# Patient Record
Sex: MALE | Race: WHITE | NOT HISPANIC OR LATINO
[De-identification: names, ages, dates, MRNs, and addresses within clinical notes are randomized per-mention and may not be internally consistent; named-entity substitution may affect disease eponyms.]

---

## 2022-01-25 ENCOUNTER — TRANSCRIPTION ENCOUNTER (OUTPATIENT)
Age: 62
End: 2022-01-25

## 2022-04-07 ENCOUNTER — APPOINTMENT (OUTPATIENT)
Dept: ORTHOPEDIC SURGERY | Facility: CLINIC | Age: 62
End: 2022-04-07
Payer: OTHER MISCELLANEOUS

## 2022-04-07 VITALS — BODY MASS INDEX: 28.44 KG/M2 | WEIGHT: 210 LBS | HEIGHT: 72 IN

## 2022-04-07 DIAGNOSIS — Z86.39 PERSONAL HISTORY OF OTHER ENDOCRINE, NUTRITIONAL AND METABOLIC DISEASE: ICD-10-CM

## 2022-04-07 DIAGNOSIS — Z86.79 PERSONAL HISTORY OF OTHER DISEASES OF THE CIRCULATORY SYSTEM: ICD-10-CM

## 2022-04-07 PROBLEM — Z00.00 ENCOUNTER FOR PREVENTIVE HEALTH EXAMINATION: Status: ACTIVE | Noted: 2022-04-07

## 2022-04-07 PROCEDURE — 99024 POSTOP FOLLOW-UP VISIT: CPT

## 2022-04-07 NOTE — PHYSICAL EXAM
[Left] : left shoulder [] : AC joint tenderness [4___] : external rotation 4[unfilled]/5 [5___] : internal rotation 5[unfilled]/5 [TWNoteComboBox7] : active forward flexion 130 degrees [de-identified] : active abduction 120 degrees [TWNoteComboBox6] : internal rotation L1 [de-identified] : external rotation 20 degrees

## 2022-04-07 NOTE — HISTORY OF PRESENT ILLNESS
[Not working due to injury] : Work status: not working due to injury [de-identified] : Pt reports some improvement since last visit \par Reports going to PT 3x/week with relief. States he was advised not to RTW yet per PT \par Denies taking pain meds.  [de-identified] : Vicki at [facility]

## 2022-04-07 NOTE — ASSESSMENT
[FreeTextEntry1] : Patient responded well to physical therapy with symptomatic improvement. Recommend continuing physical therapy to regain range of motion and strength. \par Worker's compensation 100% temporarily disabled \par Recommend: - rest - ice - compression - elevation\par FOllow up in 1 month

## 2022-05-12 ENCOUNTER — APPOINTMENT (OUTPATIENT)
Dept: ORTHOPEDIC SURGERY | Facility: CLINIC | Age: 62
End: 2022-05-12
Payer: OTHER MISCELLANEOUS

## 2022-05-12 VITALS — HEIGHT: 72 IN | WEIGHT: 210 LBS | BODY MASS INDEX: 28.44 KG/M2

## 2022-05-12 DIAGNOSIS — Z78.9 OTHER SPECIFIED HEALTH STATUS: ICD-10-CM

## 2022-05-12 PROCEDURE — 99215 OFFICE O/P EST HI 40 MIN: CPT

## 2022-05-12 PROCEDURE — 99072 ADDL SUPL MATRL&STAF TM PHE: CPT

## 2022-05-12 RX ORDER — ONDANSETRON 4 MG/1
4 TABLET ORAL
Refills: 0 | Status: ACTIVE | COMMUNITY

## 2022-05-12 RX ORDER — ATORVASTATIN CALCIUM 40 MG/1
40 TABLET, FILM COATED ORAL
Refills: 0 | Status: ACTIVE | COMMUNITY

## 2022-05-12 RX ORDER — AMLODIPINE BESYLATE 10 MG/1
10 TABLET ORAL
Refills: 0 | Status: ACTIVE | COMMUNITY

## 2022-05-12 RX ORDER — OXYCODONE AND ACETAMINOPHEN 10; 325 MG/1; MG/1
10-325 TABLET ORAL
Refills: 0 | Status: ACTIVE | COMMUNITY

## 2022-05-12 NOTE — REASON FOR VISIT
[FreeTextEntry2] : left shoulder pain after slipping off a ladder while putting up a banner at work 6/21/21\par S/P Left shoulder arthroscopy 1/26/2022

## 2022-05-12 NOTE — ASSESSMENT
[FreeTextEntry1] : Patient responded well to physical therapy with symptomatic improvement. Recommend continuing physical therapy to regain range of motion and strength. \par Worker's compensation 100% temporarily disabled \par Recommend: - rest - ice - compression - elevation\par FOllow up in 6 weeks

## 2022-05-12 NOTE — HISTORY OF PRESENT ILLNESS
[Not working due to injury] : Work status: not working due to injury [de-identified] : Vicki at [facility] [employer]  [de-identified] : Pt reports some improvement since last visit \par Reports going to PT 3x/week with relief. States he was advised not to RTW yet per PT \par Denies taking pain meds.

## 2022-05-12 NOTE — PHYSICAL EXAM
[Left] : left shoulder [4___] : external rotation 4[unfilled]/5 [5___] : internal rotation 5[unfilled]/5 [] : no deformity [TWNoteComboBox7] : active forward flexion 130 degrees [de-identified] : active abduction 120 degrees [TWNoteComboBox6] : internal rotation L1 [de-identified] : external rotation 20 degrees

## 2022-06-23 ENCOUNTER — APPOINTMENT (OUTPATIENT)
Dept: ORTHOPEDIC SURGERY | Facility: CLINIC | Age: 62
End: 2022-06-23
Payer: OTHER MISCELLANEOUS

## 2022-06-23 VITALS — BODY MASS INDEX: 28.44 KG/M2 | HEIGHT: 72 IN | WEIGHT: 210 LBS

## 2022-06-23 PROCEDURE — 99072 ADDL SUPL MATRL&STAF TM PHE: CPT

## 2022-06-23 PROCEDURE — 99215 OFFICE O/P EST HI 40 MIN: CPT

## 2022-06-23 NOTE — PHYSICAL EXAM
[Left] : left shoulder [4___] : external rotation 4[unfilled]/5 [5___] : internal rotation 5[unfilled]/5 [] : no tenderness to palpation [FreeTextEntry9] : IR -  T10 [TWNoteComboBox7] : active forward flexion 165 degrees [de-identified] : active abduction 110 degrees [TWNoteComboBox6] : internal rotation 0 degrees [de-identified] : external rotation 15 degrees

## 2022-06-23 NOTE — ASSESSMENT
[FreeTextEntry1] : Patient responded well to physical therapy with symptomatic improvement. Recommend continuing physical therapy to regain range of motion and strength. \par \par Worker's compensation 100% temporarily disabled \par \par Recommend: - rest - ice - compression - elevation\par \par Follow up in 6 weeks

## 2022-06-23 NOTE — HISTORY OF PRESENT ILLNESS
[Not working due to injury] : Work status: not working due to injury [de-identified] : Vicki at [facility]  [de-identified] : Pt reports some improvement since last visit \par Reports going to PT 3x/week with relief. States he was advised not to RTW yet per PT \par Denies taking pain meds.

## 2022-08-04 ENCOUNTER — APPOINTMENT (OUTPATIENT)
Dept: ORTHOPEDIC SURGERY | Facility: CLINIC | Age: 62
End: 2022-08-04

## 2022-08-04 VITALS — WEIGHT: 210 LBS | BODY MASS INDEX: 28.44 KG/M2 | HEIGHT: 72 IN

## 2022-08-04 PROCEDURE — 99072 ADDL SUPL MATRL&STAF TM PHE: CPT

## 2022-08-04 PROCEDURE — 99215 OFFICE O/P EST HI 40 MIN: CPT

## 2022-08-04 NOTE — HISTORY OF PRESENT ILLNESS
[Not working due to injury] : Work status: not working due to injury [de-identified] : Vicki at [facility]  [de-identified] : Pt reports some improvement since last visit \par Reports going to PT 3x/week with relief. States he was advised not to RTW yet per PT \par Denies taking pain meds.

## 2022-08-04 NOTE — PHYSICAL EXAM
[Left] : left shoulder [4___] : external rotation 4[unfilled]/5 [5___] : internal rotation 5[unfilled]/5 [] : no tenderness to palpation [FreeTextEntry9] : IR -  T10 [TWNoteComboBox7] : active forward flexion 165 degrees [de-identified] : active abduction 110 degrees [de-identified] : external rotation 15 degrees

## 2022-09-21 ENCOUNTER — APPOINTMENT (OUTPATIENT)
Dept: ORTHOPEDIC SURGERY | Facility: CLINIC | Age: 62
End: 2022-09-21

## 2022-09-21 VITALS — WEIGHT: 210 LBS | HEIGHT: 72 IN | BODY MASS INDEX: 28.44 KG/M2

## 2022-09-21 PROCEDURE — 99072 ADDL SUPL MATRL&STAF TM PHE: CPT

## 2022-09-21 PROCEDURE — 99215 OFFICE O/P EST HI 40 MIN: CPT

## 2022-09-21 NOTE — ASSESSMENT
[FreeTextEntry1] : Worker's compensation 0% temporarily disabled \par \par Patient may return to work tomorrow.  Full duty no restrictions\par \par Recommend: - rest - ice - compression - elevation\par \par Follow up in 2 months

## 2022-09-21 NOTE — PHYSICAL EXAM
[Left] : left shoulder [5___] : internal rotation 5[unfilled]/5 [4___] : external rotation 4[unfilled]/5 [] : no tenderness to palpation [FreeTextEntry9] : IR -  T10 [TWNoteComboBox7] : active forward flexion 175 degrees [de-identified] : active abduction 120 degrees [de-identified] : external rotation 30 degrees

## 2022-09-21 NOTE — HISTORY OF PRESENT ILLNESS
[Not working due to injury] : Work status: not working due to injury [de-identified] : Follow up left shoulder. Feels improvement since last visit. Doing HEP. Denies pain meds. Wants to return to work. [de-identified] : Vicki at [facility]

## 2022-11-16 ENCOUNTER — APPOINTMENT (OUTPATIENT)
Dept: ORTHOPEDIC SURGERY | Facility: CLINIC | Age: 62
End: 2022-11-16

## 2022-11-16 VITALS — BODY MASS INDEX: 28.44 KG/M2 | HEIGHT: 72 IN | WEIGHT: 210 LBS

## 2022-11-16 PROCEDURE — 99072 ADDL SUPL MATRL&STAF TM PHE: CPT

## 2022-11-16 PROCEDURE — 99215 OFFICE O/P EST HI 40 MIN: CPT

## 2022-11-16 NOTE — PHYSICAL EXAM
[Left] : left shoulder [4___] : external rotation 4[unfilled]/5 [5___] : internal rotation 5[unfilled]/5 [] : no tenderness to palpation [FreeTextEntry9] : IR -  T10 [TWNoteComboBox7] : active forward flexion 175 degrees [de-identified] : active abduction 120 degrees [de-identified] : external rotation 35 degrees

## 2022-11-16 NOTE — HISTORY OF PRESENT ILLNESS
[Not working due to injury] : Work status: not working due to injury [de-identified] : Follow up left shoulder. Goes to PT 2x a week and feels PT is helping. PT script ends tomorrow-wants another. Pt also does HEP. Takes Tylenol PRN [de-identified] : Vicki at [facility]

## 2022-11-16 NOTE — ASSESSMENT
[FreeTextEntry1] : Worker's compensation 0% temporarily disabled \par \par Continue PT \par \par Recommend: - rest - ice - compression - elevation\par \par Follow up in 2 months

## 2023-01-18 ENCOUNTER — APPOINTMENT (OUTPATIENT)
Dept: ORTHOPEDIC SURGERY | Facility: CLINIC | Age: 63
End: 2023-01-18
Payer: OTHER MISCELLANEOUS

## 2023-01-18 VITALS — WEIGHT: 210 LBS | HEIGHT: 72 IN | BODY MASS INDEX: 28.44 KG/M2

## 2023-01-18 PROCEDURE — 99215 OFFICE O/P EST HI 40 MIN: CPT

## 2023-01-18 PROCEDURE — 99072 ADDL SUPL MATRL&STAF TM PHE: CPT

## 2023-01-18 NOTE — PHYSICAL EXAM
[Left] : left shoulder [4___] : external rotation 4[unfilled]/5 [5___] : internal rotation 5[unfilled]/5 [] : no tenderness to palpation [FreeTextEntry9] : IR -  T10 [TWNoteComboBox7] : active forward flexion 175 degrees [de-identified] : active abduction 120 degrees [de-identified] : external rotation 35 degrees

## 2023-01-18 NOTE — HISTORY OF PRESENT ILLNESS
[Not working due to injury] : Work status: not working due to injury [de-identified] : Follow up left shoulder. States he does not have full ROM. Goes to PT 2x a week with some relief. Doing HEP with some relief. Takes Tylenol PRN. [de-identified] : Vicki at [facility]

## 2023-01-30 ENCOUNTER — APPOINTMENT (OUTPATIENT)
Dept: ORTHOPEDIC SURGERY | Facility: CLINIC | Age: 63
End: 2023-01-30
Payer: OTHER MISCELLANEOUS

## 2023-01-30 VITALS — WEIGHT: 215 LBS | BODY MASS INDEX: 29.12 KG/M2 | HEIGHT: 72 IN

## 2023-01-30 PROCEDURE — 99072 ADDL SUPL MATRL&STAF TM PHE: CPT

## 2023-01-30 PROCEDURE — 99214 OFFICE O/P EST MOD 30 MIN: CPT

## 2023-02-05 NOTE — IMAGING
[de-identified] : Left index finger with resolved ecchymosis and mild swelling, ski intact. +ttp at PIPj. Able to gently flex and extend at MCP, PIP and DIP with no rotational deformity. Sensation intact at radial and ulnar pulp. <2sec cap refill.\par \par Left index finger radiographs with reduced, concentric MCP, PIP and DIP joints with interval reduction following PIP joint dislocation from prior films. Thumb CMC arthritis.

## 2023-02-05 NOTE — ASSESSMENT
[FreeTextEntry1] : Left index finger PIP joint dislocation, simple - will manage with closed management\par reviewed radiographs and pathoanatomy with patient. Discussed the current alignment both radiographically and clinically are within acceptable parameters to manage nonoperatively. Will manage in coban tavo tape, ROM permitted, WBAT. Elevate. Discussed risks of pain, stiffness, and displacement requiring intervention. OT for ROM.\par \par Patient can be full duty; good prognosis.\par \par F/u 4 weeks (also with advanced CMC arthritis)

## 2023-02-05 NOTE — HISTORY OF PRESENT ILLNESS
[de-identified] : 62M, RHD, PMHx of HTN, HLD presents with left index finger injury which occurred while working on 1/20/23. Patient proerts he was holding a bunch of things when he fell and the bungie cord holding things fell. causing him to put his hand out. Admits to jamming finger. Admits to going to [facility] on 1/20/23, radiographs obtained and was told he had a chip in his finger by the knuckle. Admits to being splinted. Admits to numbness/tingling, swelling, some pain.

## 2023-02-15 ENCOUNTER — FORM ENCOUNTER (OUTPATIENT)
Age: 63
End: 2023-02-15

## 2023-02-27 ENCOUNTER — APPOINTMENT (OUTPATIENT)
Dept: ORTHOPEDIC SURGERY | Facility: CLINIC | Age: 63
End: 2023-02-27
Payer: OTHER MISCELLANEOUS

## 2023-02-27 PROCEDURE — 99214 OFFICE O/P EST MOD 30 MIN: CPT

## 2023-02-27 PROCEDURE — 99072 ADDL SUPL MATRL&STAF TM PHE: CPT

## 2023-02-27 NOTE — HISTORY OF PRESENT ILLNESS
[de-identified] : 62M, RHD, PMHx of HTN, HLD presents with left index finger injury which occurred while working on 1/20/23. Patient reports he was holding a bunch of things when he fell and the bungie cord holding things fell. causing him to put his hand out. Admits to jamming finger. Admits to going to [facility] on 1/20/23, radiographs obtained and was told he had a chip in his finger by the knuckle. Admits to being splinted. Admits to numbness/tingling, swelling, some pain. \par \par 2/27/23: f/u left index finger. Reports having a lot of swelling. Hasn't used brace. admits to numbness/tingling.

## 2023-02-27 NOTE — IMAGING
[de-identified] : Left index finger with resolved ecchymosis and mild swelling, ski intact. +ttp at PIPj. Able to gently flex and extend at MCP, PIP and DIP with no rotational deformity. Sensation intact at radial and ulnar pulp. <2sec cap refill.\par \par Left index finger radiographs with reduced, concentric MCP, PIP and DIP joints with interval reduction following PIP joint dislocation from prior films. Thumb CMC arthritis.

## 2023-02-27 NOTE — ASSESSMENT
[FreeTextEntry1] : Left index finger PIP joint dislocation, simple - will manage with closed management\par reviewed radiographs and pathoanatomy with patient. Discussed the current alignment both radiographically and clinically are within acceptable parameters to manage nonoperatively. Will manage in coban tavo tape, ROM permitted, WBAT. Elevate. Discussed risks of pain, stiffness, and displacement requiring intervention. OT for ROM.\par \par Patient can be full duty; good prognosis.\par \par Will pursue OT for ROM and strengthening to increase ROM and decrease swelling.\par \par F/u 4 weeks (also with advanced CMC arthritis)

## 2023-03-15 ENCOUNTER — APPOINTMENT (OUTPATIENT)
Dept: ORTHOPEDIC SURGERY | Facility: CLINIC | Age: 63
End: 2023-03-15
Payer: OTHER MISCELLANEOUS

## 2023-03-15 VITALS — WEIGHT: 215 LBS | HEIGHT: 72 IN | BODY MASS INDEX: 29.12 KG/M2

## 2023-03-15 DIAGNOSIS — S46.012D STRAIN OF MUSCLE(S) AND TENDON(S) OF THE ROTATOR CUFF OF LEFT SHOULDER, SUBSEQUENT ENCOUNTER: ICD-10-CM

## 2023-03-15 PROCEDURE — 99072 ADDL SUPL MATRL&STAF TM PHE: CPT

## 2023-03-15 PROCEDURE — 99455 WORK RELATED DISABILITY EXAM: CPT

## 2023-03-15 NOTE — HISTORY OF PRESENT ILLNESS
[Not working due to injury] : Work status: not working due to injury [de-identified] : Follow up left shoulder. States he hasn't gained his full strength back. Has his last PT session tomorrow. Doing HEP with some relief. Takes Tylenol PRN. [de-identified] : Vicki at [facility]

## 2023-03-15 NOTE — ASSESSMENT
[FreeTextEntry1] : Left shoulder SLU\par Goniometer used \par Abduction - 30%\par DCR - 10%\par Total - 40%

## 2023-03-15 NOTE — WORK
[Has the patient reached Maximum Medical Improvement? If yes, indicate date___] : Yes, on [unfilled] [Is there permanent partial impairment?] : Yes [FreeTextEntry6] : left shoulder  [Left] : left [Yes] : Yes [FreeTextEntry7] : shoulder [FreeTextEntry8] : Abduction - 120 [de-identified] : 180 [de-identified] : distal clavicle excision  [FreeTextEntry5] : 10 [de-identified] : Abduction - 30%\par Distal clavicle excision - 10%\par Total - 40%

## 2023-03-15 NOTE — PHYSICAL EXAM
[Left] : left shoulder [4___] : external rotation 4[unfilled]/5 [5___] : internal rotation 5[unfilled]/5 [] : no tenderness to palpation [FreeTextEntry9] : IR -  T10 [TWNoteComboBox7] : active forward flexion 175 degrees [de-identified] : active abduction 120 degrees [de-identified] : external rotation 35 degrees

## 2023-03-23 ENCOUNTER — FORM ENCOUNTER (OUTPATIENT)
Age: 63
End: 2023-03-23

## 2023-03-27 ENCOUNTER — APPOINTMENT (OUTPATIENT)
Dept: ORTHOPEDIC SURGERY | Facility: CLINIC | Age: 63
End: 2023-03-27
Payer: OTHER MISCELLANEOUS

## 2023-03-27 DIAGNOSIS — S63.259A UNSPECIFIED DISLOCATION OF UNSPECIFIED FINGER, INITIAL ENCOUNTER: ICD-10-CM

## 2023-03-27 PROCEDURE — 73140 X-RAY EXAM OF FINGER(S): CPT | Mod: LT

## 2023-03-27 PROCEDURE — 99214 OFFICE O/P EST MOD 30 MIN: CPT

## 2023-03-27 NOTE — HISTORY OF PRESENT ILLNESS
[de-identified] : 62M, RHD, PMHx of HTN, HLD presents with left index finger injury which occurred while working on 1/20/23. Patient reports he was holding a bunch of things when he fell and the bungie cord holding things fell. causing him to put his hand out. Admits to jamming finger. Admits to going to [facility] on 1/20/23, radiographs obtained and was told he had a chip in his finger by the knuckle. Admits to being splinted. Admits to numbness/tingling, swelling, some pain. \par \par 2/27/23: f/u left index finger. Reports having a lot of swelling. Hasn't used brace. admits to numbness/tingling. \par \par 3/27/23: f/u left index finger. Admits to still having a lot of swelling. When he wears the finger splint, swelling will go down and he will feel okay - admits to having swelling and difficulty with flexion of finger. Has done some OT, not really improving much.

## 2023-03-27 NOTE — IMAGING
[de-identified] : Left index finger with resolved ecchymosis and mild swelling, ski intact. +ttp at PIPj. Able to gently flex and extend at MCP, PIP and DIP with no rotational deformity. Sensation intact at radial and ulnar pulp. <2sec cap refill.\par \par Left index finger radiographs with reduced, concentric MCP, PIP and DIP joints with interval reduction following PIP joint dislocation from prior films. Thumb CMC arthritis.

## 2023-03-27 NOTE — ASSESSMENT
[FreeTextEntry1] : Left index finger PIP joint dislocation, simple - will cotninue to manage with closed management\par reviewed radiographs and pathoanatomy with patient. Discussed the current alignment both radiographically and clinically are within acceptable parameters to manage nonoperatively. Will manage in coban tavo tape, ROM permitted, WBAT. Elevate. Discussed risks of pain, stiffness, and displacement requiring intervention. OT for ROM.\par \par Patient can be full duty; good prognosis.\par \par Will pursue OT for ROM and strengthening to increase ROM and decrease swelling.\par \par F/u prn

## 2023-04-12 ENCOUNTER — FORM ENCOUNTER (OUTPATIENT)
Age: 63
End: 2023-04-12